# Patient Record
Sex: FEMALE | Race: ASIAN | NOT HISPANIC OR LATINO
[De-identification: names, ages, dates, MRNs, and addresses within clinical notes are randomized per-mention and may not be internally consistent; named-entity substitution may affect disease eponyms.]

---

## 2018-02-05 ENCOUNTER — ASOB RESULT (OUTPATIENT)
Age: 38
End: 2018-02-05

## 2018-02-05 ENCOUNTER — APPOINTMENT (OUTPATIENT)
Dept: OBGYN | Facility: CLINIC | Age: 38
End: 2018-02-05
Payer: COMMERCIAL

## 2018-02-05 VITALS
DIASTOLIC BLOOD PRESSURE: 78 MMHG | HEART RATE: 96 BPM | SYSTOLIC BLOOD PRESSURE: 120 MMHG | WEIGHT: 142 LBS | HEIGHT: 59 IN | BODY MASS INDEX: 28.63 KG/M2

## 2018-02-05 PROCEDURE — 76801 OB US < 14 WKS SINGLE FETUS: CPT

## 2018-02-05 PROCEDURE — 0501F PRENATAL FLOW SHEET: CPT

## 2018-02-05 RX ORDER — PNV NO.95/FERROUS FUM/FOLIC AC 28MG-0.8MG
TABLET ORAL
Refills: 0 | Status: ACTIVE | COMMUNITY

## 2018-02-11 LAB
ABO + RH PNL BLD: NORMAL
B19V IGG SER QL IA: 0.4 INDEX
B19V IGG+IGM SER-IMP: NEGATIVE
B19V IGG+IGM SER-IMP: NORMAL
B19V IGM FLD-ACNC: 0.3 INDEX
B19V IGM SER-ACNC: NEGATIVE
BACTERIA UR CULT: NORMAL
BASOPHILS # BLD AUTO: 0.01 K/UL
BASOPHILS NFR BLD AUTO: 0.1 %
BLD GP AB SCN SERPL QL: NORMAL
C TRACH RRNA SPEC QL NAA+PROBE: NOT DETECTED
CMV IGG SERPL QL: 6.7 U/ML
CMV IGG SERPL-IMP: POSITIVE
CMV IGM SERPL QL: <8 AU/ML
CMV IGM SERPL QL: NEGATIVE
CYTOLOGY CVX/VAG DOC THIN PREP: NORMAL
EOSINOPHIL # BLD AUTO: 0.08 K/UL
EOSINOPHIL NFR BLD AUTO: 0.8 %
FMR1 GENE MUT ANL BLD/T: NORMAL
HBV SURFACE AG SER QL: NONREACTIVE
HCT VFR BLD CALC: 36.9 %
HGB A MFR BLD: 96.9 %
HGB A2 MFR BLD: 3.1 %
HGB BLD-MCNC: 11.8 G/DL
HGB FRACT BLD-IMP: NORMAL
HIV1+2 AB SPEC QL IA.RAPID: NONREACTIVE
HPV HIGH+LOW RISK DNA PNL CVX: NOT DETECTED
IMM GRANULOCYTES NFR BLD AUTO: 0.3 %
LYMPHOCYTES # BLD AUTO: 1.7 K/UL
LYMPHOCYTES NFR BLD AUTO: 16 %
MAN DIFF?: NORMAL
MCHC RBC-ENTMCNC: 28.5 PG
MCHC RBC-ENTMCNC: 32 GM/DL
MCV RBC AUTO: 89.1 FL
MONOCYTES # BLD AUTO: 0.61 K/UL
MONOCYTES NFR BLD AUTO: 5.7 %
N GONORRHOEA RRNA SPEC QL NAA+PROBE: NOT DETECTED
NEUTROPHILS # BLD AUTO: 8.22 K/UL
NEUTROPHILS NFR BLD AUTO: 77.1 %
PLATELET # BLD AUTO: 209 K/UL
RBC # BLD: 4.14 M/UL
RBC # FLD: 13.4 %
RUBV IGG FLD-ACNC: 4.8 INDEX
RUBV IGG SER-IMP: POSITIVE
SOURCE AMPLIFICATION: NORMAL
T GONDII AB SER-IMP: NEGATIVE
T GONDII AB SER-IMP: NEGATIVE
T GONDII IGG SER QL: <3 IU/ML
T GONDII IGM SER QL: <3 AU/ML
T PALLIDUM AB SER QL IA: NEGATIVE
VZV AB TITR SER: POSITIVE
VZV IGG SER IF-ACNC: 601.5 INDEX
WBC # FLD AUTO: 10.65 K/UL

## 2018-02-16 LAB
AR GENE MUT ANL BLD/T: NORMAL
CFTR MUT TESTED BLD/T: NORMAL

## 2018-02-28 ENCOUNTER — APPOINTMENT (OUTPATIENT)
Dept: ANTEPARTUM | Facility: CLINIC | Age: 38
End: 2018-02-28
Payer: COMMERCIAL

## 2018-02-28 ENCOUNTER — ASOB RESULT (OUTPATIENT)
Age: 38
End: 2018-02-28

## 2018-02-28 ENCOUNTER — APPOINTMENT (OUTPATIENT)
Dept: ANTEPARTUM | Facility: CLINIC | Age: 38
End: 2018-02-28

## 2018-02-28 PROCEDURE — 76801 OB US < 14 WKS SINGLE FETUS: CPT

## 2018-02-28 PROCEDURE — 76813 OB US NUCHAL MEAS 1 GEST: CPT

## 2018-02-28 PROCEDURE — 36416 COLLJ CAPILLARY BLOOD SPEC: CPT

## 2018-03-06 ENCOUNTER — NON-APPOINTMENT (OUTPATIENT)
Age: 38
End: 2018-03-06

## 2018-03-06 ENCOUNTER — APPOINTMENT (OUTPATIENT)
Dept: OBGYN | Facility: CLINIC | Age: 38
End: 2018-03-06
Payer: COMMERCIAL

## 2018-03-06 VITALS
WEIGHT: 142 LBS | DIASTOLIC BLOOD PRESSURE: 74 MMHG | BODY MASS INDEX: 28.63 KG/M2 | HEIGHT: 59 IN | SYSTOLIC BLOOD PRESSURE: 112 MMHG

## 2018-03-06 DIAGNOSIS — Z87.59 PERSONAL HISTORY OF OTHER COMPLICATIONS OF PREGNANCY, CHILDBIRTH AND THE PUERPERIUM: ICD-10-CM

## 2018-03-06 PROCEDURE — 0502F SUBSEQUENT PRENATAL CARE: CPT

## 2018-03-31 ENCOUNTER — NON-APPOINTMENT (OUTPATIENT)
Age: 38
End: 2018-03-31

## 2018-04-12 ENCOUNTER — TRANSCRIPTION ENCOUNTER (OUTPATIENT)
Age: 38
End: 2018-04-12

## 2018-04-12 ENCOUNTER — NON-APPOINTMENT (OUTPATIENT)
Age: 38
End: 2018-04-12

## 2018-04-12 ENCOUNTER — LABORATORY RESULT (OUTPATIENT)
Age: 38
End: 2018-04-12

## 2018-04-12 ENCOUNTER — APPOINTMENT (OUTPATIENT)
Dept: OBGYN | Facility: CLINIC | Age: 38
End: 2018-04-12
Payer: COMMERCIAL

## 2018-04-12 VITALS
HEIGHT: 59 IN | BODY MASS INDEX: 29.64 KG/M2 | SYSTOLIC BLOOD PRESSURE: 106 MMHG | DIASTOLIC BLOOD PRESSURE: 71 MMHG | WEIGHT: 147 LBS

## 2018-04-12 PROCEDURE — 0502F SUBSEQUENT PRENATAL CARE: CPT

## 2018-04-12 RX ORDER — DOXYLAMINE SUCCINATE AND PYRIDOXINE HYDROCHLORIDE 10; 10 MG/1; MG/1
10-10 TABLET, DELAYED RELEASE ORAL
Qty: 30 | Refills: 2 | Status: DISCONTINUED | COMMUNITY
Start: 2018-03-06 | End: 2018-04-12

## 2018-04-13 ENCOUNTER — NON-APPOINTMENT (OUTPATIENT)
Age: 38
End: 2018-04-13

## 2018-04-16 ENCOUNTER — OTHER (OUTPATIENT)
Age: 38
End: 2018-04-16

## 2018-04-16 LAB
2ND TRIMESTER DATA: NORMAL
AFP PNL SERPL: NORMAL
AFP SERPL-ACNC: NORMAL
CLINICAL BIOCHEMIST REVIEW: NORMAL
NOTES NTD: NORMAL

## 2018-04-30 ENCOUNTER — APPOINTMENT (OUTPATIENT)
Dept: OBGYN | Facility: CLINIC | Age: 38
End: 2018-04-30
Payer: COMMERCIAL

## 2018-04-30 VITALS
HEIGHT: 59 IN | SYSTOLIC BLOOD PRESSURE: 100 MMHG | WEIGHT: 152.13 LBS | DIASTOLIC BLOOD PRESSURE: 69 MMHG | BODY MASS INDEX: 30.67 KG/M2

## 2018-04-30 PROCEDURE — 0502F SUBSEQUENT PRENATAL CARE: CPT

## 2018-05-01 ENCOUNTER — APPOINTMENT (OUTPATIENT)
Dept: ANTEPARTUM | Facility: CLINIC | Age: 38
End: 2018-05-01
Payer: COMMERCIAL

## 2018-05-01 ENCOUNTER — ASOB RESULT (OUTPATIENT)
Age: 38
End: 2018-05-01

## 2018-05-01 PROCEDURE — 99201 OFFICE OUTPATIENT NEW 10 MINUTES: CPT | Mod: 25

## 2018-05-01 PROCEDURE — 76811 OB US DETAILED SNGL FETUS: CPT

## 2018-05-31 ENCOUNTER — NON-APPOINTMENT (OUTPATIENT)
Age: 38
End: 2018-05-31

## 2018-05-31 ENCOUNTER — APPOINTMENT (OUTPATIENT)
Dept: OBGYN | Facility: CLINIC | Age: 38
End: 2018-05-31
Payer: COMMERCIAL

## 2018-05-31 VITALS
DIASTOLIC BLOOD PRESSURE: 72 MMHG | HEIGHT: 59 IN | SYSTOLIC BLOOD PRESSURE: 113 MMHG | BODY MASS INDEX: 31.45 KG/M2 | WEIGHT: 156 LBS

## 2018-05-31 PROCEDURE — 0502F SUBSEQUENT PRENATAL CARE: CPT

## 2018-06-12 ENCOUNTER — ASOB RESULT (OUTPATIENT)
Age: 38
End: 2018-06-12

## 2018-06-12 ENCOUNTER — APPOINTMENT (OUTPATIENT)
Dept: ANTEPARTUM | Facility: CLINIC | Age: 38
End: 2018-06-12
Payer: COMMERCIAL

## 2018-06-12 PROCEDURE — 76816 OB US FOLLOW-UP PER FETUS: CPT

## 2018-06-14 ENCOUNTER — NON-APPOINTMENT (OUTPATIENT)
Age: 38
End: 2018-06-14

## 2018-06-14 ENCOUNTER — APPOINTMENT (OUTPATIENT)
Dept: OBGYN | Facility: CLINIC | Age: 38
End: 2018-06-14
Payer: COMMERCIAL

## 2018-06-14 VITALS
SYSTOLIC BLOOD PRESSURE: 115 MMHG | BODY MASS INDEX: 31.85 KG/M2 | HEIGHT: 59 IN | DIASTOLIC BLOOD PRESSURE: 71 MMHG | WEIGHT: 158 LBS

## 2018-06-14 PROCEDURE — 90471 IMMUNIZATION ADMIN: CPT

## 2018-06-14 PROCEDURE — 90715 TDAP VACCINE 7 YRS/> IM: CPT

## 2018-06-14 PROCEDURE — 0502F SUBSEQUENT PRENATAL CARE: CPT

## 2018-06-15 ENCOUNTER — OTHER (OUTPATIENT)
Age: 38
End: 2018-06-15

## 2018-06-15 LAB — GLUCOSE 1H P 50 G GLC PO SERPL-MCNC: 172 MG/DL

## 2018-06-18 ENCOUNTER — OTHER (OUTPATIENT)
Age: 38
End: 2018-06-18

## 2018-06-19 ENCOUNTER — OTHER (OUTPATIENT)
Age: 38
End: 2018-06-19

## 2018-06-19 LAB
BASOPHILS # BLD AUTO: 0.01 K/UL
BASOPHILS NFR BLD AUTO: 0.1 %
EOSINOPHIL # BLD AUTO: 0.1 K/UL
EOSINOPHIL NFR BLD AUTO: 1.2 %
HCT VFR BLD CALC: 31 %
HGB BLD-MCNC: 9.7 G/DL
IMM GRANULOCYTES NFR BLD AUTO: 0.6 %
LYMPHOCYTES # BLD AUTO: 1.58 K/UL
LYMPHOCYTES NFR BLD AUTO: 19.4 %
MAN DIFF?: NORMAL
MCHC RBC-ENTMCNC: 28.4 PG
MCHC RBC-ENTMCNC: 31.3 GM/DL
MCV RBC AUTO: 90.6 FL
MONOCYTES # BLD AUTO: 0.37 K/UL
MONOCYTES NFR BLD AUTO: 4.5 %
NEUTROPHILS # BLD AUTO: 6.05 K/UL
NEUTROPHILS NFR BLD AUTO: 74.2 %
PLATELET # BLD AUTO: 146 K/UL
RBC # BLD: 3.42 M/UL
RBC # FLD: 13.5 %
WBC # FLD AUTO: 8.16 K/UL

## 2018-06-27 ENCOUNTER — APPOINTMENT (OUTPATIENT)
Dept: OBGYN | Facility: CLINIC | Age: 38
End: 2018-06-27
Payer: COMMERCIAL

## 2018-06-27 ENCOUNTER — NON-APPOINTMENT (OUTPATIENT)
Age: 38
End: 2018-06-27

## 2018-06-27 VITALS
WEIGHT: 157 LBS | HEIGHT: 59 IN | DIASTOLIC BLOOD PRESSURE: 73 MMHG | SYSTOLIC BLOOD PRESSURE: 111 MMHG | BODY MASS INDEX: 31.65 KG/M2

## 2018-06-27 PROCEDURE — 0502F SUBSEQUENT PRENATAL CARE: CPT

## 2018-07-10 ENCOUNTER — ASOB RESULT (OUTPATIENT)
Age: 38
End: 2018-07-10

## 2018-07-10 ENCOUNTER — APPOINTMENT (OUTPATIENT)
Dept: ANTEPARTUM | Facility: CLINIC | Age: 38
End: 2018-07-10
Payer: COMMERCIAL

## 2018-07-10 PROCEDURE — 76819 FETAL BIOPHYS PROFIL W/O NST: CPT

## 2018-07-10 PROCEDURE — 76816 OB US FOLLOW-UP PER FETUS: CPT

## 2018-07-13 ENCOUNTER — APPOINTMENT (OUTPATIENT)
Dept: OBGYN | Facility: CLINIC | Age: 38
End: 2018-07-13
Payer: COMMERCIAL

## 2018-07-13 ENCOUNTER — NON-APPOINTMENT (OUTPATIENT)
Age: 38
End: 2018-07-13

## 2018-07-13 VITALS
DIASTOLIC BLOOD PRESSURE: 74 MMHG | WEIGHT: 159.19 LBS | BODY MASS INDEX: 32.09 KG/M2 | HEIGHT: 59 IN | SYSTOLIC BLOOD PRESSURE: 108 MMHG

## 2018-07-13 VITALS — HEIGHT: 59 IN

## 2018-07-13 PROCEDURE — 0502F SUBSEQUENT PRENATAL CARE: CPT

## 2018-07-27 ENCOUNTER — APPOINTMENT (OUTPATIENT)
Dept: OBGYN | Facility: CLINIC | Age: 38
End: 2018-07-27

## 2018-07-27 VITALS
WEIGHT: 159.9 LBS | HEIGHT: 59 IN | SYSTOLIC BLOOD PRESSURE: 117 MMHG | DIASTOLIC BLOOD PRESSURE: 74 MMHG | BODY MASS INDEX: 32.24 KG/M2

## 2018-08-07 ENCOUNTER — ASOB RESULT (OUTPATIENT)
Age: 38
End: 2018-08-07

## 2018-08-07 ENCOUNTER — APPOINTMENT (OUTPATIENT)
Dept: ANTEPARTUM | Facility: CLINIC | Age: 38
End: 2018-08-07
Payer: COMMERCIAL

## 2018-08-07 PROCEDURE — 76819 FETAL BIOPHYS PROFIL W/O NST: CPT

## 2018-08-07 PROCEDURE — 76816 OB US FOLLOW-UP PER FETUS: CPT

## 2018-08-10 ENCOUNTER — NON-APPOINTMENT (OUTPATIENT)
Age: 38
End: 2018-08-10

## 2018-08-10 ENCOUNTER — APPOINTMENT (OUTPATIENT)
Dept: OBGYN | Facility: CLINIC | Age: 38
End: 2018-08-10
Payer: COMMERCIAL

## 2018-08-10 VITALS
BODY MASS INDEX: 32.86 KG/M2 | DIASTOLIC BLOOD PRESSURE: 75 MMHG | WEIGHT: 163 LBS | HEIGHT: 59 IN | SYSTOLIC BLOOD PRESSURE: 116 MMHG

## 2018-08-10 PROCEDURE — 0502F SUBSEQUENT PRENATAL CARE: CPT

## 2018-08-15 ENCOUNTER — APPOINTMENT (OUTPATIENT)
Dept: OBGYN | Facility: CLINIC | Age: 38
End: 2018-08-15
Payer: COMMERCIAL

## 2018-08-15 ENCOUNTER — NON-APPOINTMENT (OUTPATIENT)
Age: 38
End: 2018-08-15

## 2018-08-15 VITALS
BODY MASS INDEX: 33.06 KG/M2 | WEIGHT: 164 LBS | DIASTOLIC BLOOD PRESSURE: 72 MMHG | HEIGHT: 59 IN | SYSTOLIC BLOOD PRESSURE: 108 MMHG

## 2018-08-15 PROCEDURE — 0502F SUBSEQUENT PRENATAL CARE: CPT

## 2018-08-17 ENCOUNTER — APPOINTMENT (OUTPATIENT)
Dept: OBGYN | Facility: CLINIC | Age: 38
End: 2018-08-17

## 2018-08-22 ENCOUNTER — OUTPATIENT (OUTPATIENT)
Dept: OUTPATIENT SERVICES | Facility: HOSPITAL | Age: 38
LOS: 1 days | End: 2018-08-22

## 2018-08-22 DIAGNOSIS — Z98.890 OTHER SPECIFIED POSTPROCEDURAL STATES: ICD-10-CM

## 2018-08-22 LAB
APPEARANCE UR: CLEAR — SIGNIFICANT CHANGE UP
BACTERIA # UR AUTO: SIGNIFICANT CHANGE UP
BILIRUB UR-MCNC: NEGATIVE — SIGNIFICANT CHANGE UP
BLD GP AB SCN SERPL QL: NEGATIVE — SIGNIFICANT CHANGE UP
BLOOD UR QL VISUAL: NEGATIVE — SIGNIFICANT CHANGE UP
COLOR SPEC: SIGNIFICANT CHANGE UP
GLUCOSE UR-MCNC: NEGATIVE — SIGNIFICANT CHANGE UP
HCT VFR BLD CALC: 30.9 % — LOW (ref 34.5–45)
HGB BLD-MCNC: 9.7 G/DL — LOW (ref 11.5–15.5)
KETONES UR-MCNC: NEGATIVE — SIGNIFICANT CHANGE UP
LEUKOCYTE ESTERASE UR-ACNC: SIGNIFICANT CHANGE UP
MCHC RBC-ENTMCNC: 27.6 PG — SIGNIFICANT CHANGE UP (ref 27–34)
MCHC RBC-ENTMCNC: 31.4 % — LOW (ref 32–36)
MCV RBC AUTO: 87.8 FL — SIGNIFICANT CHANGE UP (ref 80–100)
NITRITE UR-MCNC: NEGATIVE — SIGNIFICANT CHANGE UP
NRBC # FLD: 0 — SIGNIFICANT CHANGE UP
PH UR: 6.5 — SIGNIFICANT CHANGE UP (ref 5–8)
PLATELET # BLD AUTO: 118 K/UL — LOW (ref 150–400)
PMV BLD: 12.8 FL — SIGNIFICANT CHANGE UP (ref 7–13)
PROT UR-MCNC: SIGNIFICANT CHANGE UP
RBC # BLD: 3.52 M/UL — LOW (ref 3.8–5.2)
RBC # FLD: 14 % — SIGNIFICANT CHANGE UP (ref 10.3–14.5)
RBC CASTS # UR COMP ASSIST: SIGNIFICANT CHANGE UP (ref 0–?)
RH IG SCN BLD-IMP: POSITIVE — SIGNIFICANT CHANGE UP
SP GR SPEC: 1.02 — SIGNIFICANT CHANGE UP (ref 1–1.04)
SQUAMOUS # UR AUTO: SIGNIFICANT CHANGE UP
UROBILINOGEN FLD QL: NORMAL — SIGNIFICANT CHANGE UP
WBC # BLD: 5.71 K/UL — SIGNIFICANT CHANGE UP (ref 3.8–10.5)
WBC # FLD AUTO: 5.71 K/UL — SIGNIFICANT CHANGE UP (ref 3.8–10.5)
WBC UR QL: HIGH (ref 0–?)

## 2018-08-23 LAB — T PALLIDUM AB TITR SER: NEGATIVE — SIGNIFICANT CHANGE UP

## 2018-08-24 ENCOUNTER — APPOINTMENT (OUTPATIENT)
Dept: OBGYN | Facility: CLINIC | Age: 38
End: 2018-08-24
Payer: COMMERCIAL

## 2018-08-24 VITALS
BODY MASS INDEX: 32.86 KG/M2 | DIASTOLIC BLOOD PRESSURE: 76 MMHG | SYSTOLIC BLOOD PRESSURE: 113 MMHG | HEIGHT: 59 IN | WEIGHT: 163 LBS

## 2018-08-24 PROCEDURE — 0502F SUBSEQUENT PRENATAL CARE: CPT

## 2018-08-28 ENCOUNTER — APPOINTMENT (OUTPATIENT)
Dept: ANTEPARTUM | Facility: CLINIC | Age: 38
End: 2018-08-28
Payer: COMMERCIAL

## 2018-08-28 ENCOUNTER — NON-APPOINTMENT (OUTPATIENT)
Age: 38
End: 2018-08-28

## 2018-08-28 ENCOUNTER — APPOINTMENT (OUTPATIENT)
Dept: OBGYN | Facility: CLINIC | Age: 38
End: 2018-08-28
Payer: COMMERCIAL

## 2018-08-28 ENCOUNTER — ASOB RESULT (OUTPATIENT)
Age: 38
End: 2018-08-28

## 2018-08-28 VITALS
HEIGHT: 59 IN | DIASTOLIC BLOOD PRESSURE: 79 MMHG | BODY MASS INDEX: 33.26 KG/M2 | WEIGHT: 165 LBS | SYSTOLIC BLOOD PRESSURE: 118 MMHG

## 2018-08-28 LAB
GLUCOSE 1H P 100 G GLC PO SERPL-MCNC: 147 MG/DL
GLUCOSE 2H P 100 G GLC PO SERPL-MCNC: 136 MG/DL
GLUCOSE 3H P CHAL SERPL-MCNC: 116 MG/DL
GLUCOSE BS SERPL-MCNC: 81 MG/DL
GP B STREP DNA SPEC QL NAA+PROBE: DETECTED
GP B STREP DNA SPEC QL NAA+PROBE: NORMAL
SOURCE GBS: NORMAL

## 2018-08-28 PROCEDURE — 76816 OB US FOLLOW-UP PER FETUS: CPT

## 2018-08-28 PROCEDURE — 0502F SUBSEQUENT PRENATAL CARE: CPT

## 2018-08-28 PROCEDURE — 76819 FETAL BIOPHYS PROFIL W/O NST: CPT

## 2018-09-05 ENCOUNTER — NON-APPOINTMENT (OUTPATIENT)
Age: 38
End: 2018-09-05

## 2018-09-05 ENCOUNTER — TRANSCRIPTION ENCOUNTER (OUTPATIENT)
Age: 38
End: 2018-09-05

## 2018-09-05 ENCOUNTER — APPOINTMENT (OUTPATIENT)
Dept: OBGYN | Facility: CLINIC | Age: 38
End: 2018-09-05
Payer: COMMERCIAL

## 2018-09-05 VITALS
BODY MASS INDEX: 33.35 KG/M2 | DIASTOLIC BLOOD PRESSURE: 70 MMHG | HEIGHT: 59 IN | WEIGHT: 165.44 LBS | SYSTOLIC BLOOD PRESSURE: 104 MMHG

## 2018-09-05 PROCEDURE — 0502F SUBSEQUENT PRENATAL CARE: CPT

## 2018-09-06 ENCOUNTER — APPOINTMENT (OUTPATIENT)
Dept: OBGYN | Facility: HOSPITAL | Age: 38
End: 2018-09-06

## 2018-09-06 ENCOUNTER — INPATIENT (INPATIENT)
Facility: HOSPITAL | Age: 38
LOS: 2 days | Discharge: HOME | End: 2018-09-09
Attending: OBSTETRICS & GYNECOLOGY | Admitting: OBSTETRICS & GYNECOLOGY
Payer: COMMERCIAL

## 2018-09-06 ENCOUNTER — TRANSCRIPTION ENCOUNTER (OUTPATIENT)
Age: 38
End: 2018-09-06

## 2018-09-06 VITALS — HEIGHT: 59 IN | WEIGHT: 163.14 LBS

## 2018-09-06 DIAGNOSIS — Z98.890 OTHER SPECIFIED POSTPROCEDURAL STATES: ICD-10-CM

## 2018-09-06 LAB
ALBUMIN SERPL ELPH-MCNC: 2.8 G/DL — LOW (ref 3.3–5)
ALBUMIN SERPL ELPH-MCNC: 2.8 G/DL — LOW (ref 3.3–5)
ALP SERPL-CCNC: 172 U/L — HIGH (ref 40–120)
ALP SERPL-CCNC: 172 U/L — HIGH (ref 40–120)
ALT FLD-CCNC: 4 U/L — SIGNIFICANT CHANGE UP (ref 4–33)
ALT FLD-CCNC: 4 U/L — SIGNIFICANT CHANGE UP (ref 4–33)
APTT BLD: 31.4 SEC — SIGNIFICANT CHANGE UP (ref 27.5–37.4)
AST SERPL-CCNC: 35 U/L — HIGH (ref 4–32)
AST SERPL-CCNC: 35 U/L — HIGH (ref 4–32)
BASOPHILS # BLD AUTO: 0.02 K/UL — SIGNIFICANT CHANGE UP (ref 0–0.2)
BASOPHILS NFR BLD AUTO: 0.4 % — SIGNIFICANT CHANGE UP (ref 0–2)
BILIRUB DIRECT SERPL-MCNC: 0.1 MG/DL — SIGNIFICANT CHANGE UP (ref 0.1–0.2)
BILIRUB SERPL-MCNC: < 0.2 MG/DL — LOW (ref 0.2–1.2)
BILIRUB SERPL-MCNC: < 0.2 MG/DL — LOW (ref 0.2–1.2)
BLD GP AB SCN SERPL QL: NEGATIVE — SIGNIFICANT CHANGE UP
BUN SERPL-MCNC: 8 MG/DL — SIGNIFICANT CHANGE UP (ref 7–23)
CALCIUM SERPL-MCNC: 8.6 MG/DL — SIGNIFICANT CHANGE UP (ref 8.4–10.5)
CHLORIDE SERPL-SCNC: 106 MMOL/L — SIGNIFICANT CHANGE UP (ref 98–107)
CO2 SERPL-SCNC: 20 MMOL/L — LOW (ref 22–31)
CREAT SERPL-MCNC: 0.6 MG/DL — SIGNIFICANT CHANGE UP (ref 0.5–1.3)
EOSINOPHIL # BLD AUTO: 0.07 K/UL — SIGNIFICANT CHANGE UP (ref 0–0.5)
EOSINOPHIL NFR BLD AUTO: 1.2 % — SIGNIFICANT CHANGE UP (ref 0–6)
FIBRINOGEN PPP-MCNC: 506.3 MG/DL — SIGNIFICANT CHANGE UP (ref 310–510)
GLUCOSE SERPL-MCNC: 84 MG/DL — SIGNIFICANT CHANGE UP (ref 70–99)
HCT VFR BLD CALC: 30.2 % — LOW (ref 34.5–45)
HCT VFR BLD CALC: 31 % — LOW (ref 34.5–45)
HGB BLD-MCNC: 10.1 G/DL — LOW (ref 11.5–15.5)
HGB BLD-MCNC: 9.7 G/DL — LOW (ref 11.5–15.5)
IMM GRANULOCYTES # BLD AUTO: 0.05 # — SIGNIFICANT CHANGE UP
IMM GRANULOCYTES NFR BLD AUTO: 0.9 % — SIGNIFICANT CHANGE UP (ref 0–1.5)
INR BLD: 0.91 — SIGNIFICANT CHANGE UP (ref 0.88–1.17)
LYMPHOCYTES # BLD AUTO: 1.35 K/UL — SIGNIFICANT CHANGE UP (ref 1–3.3)
LYMPHOCYTES # BLD AUTO: 23.6 % — SIGNIFICANT CHANGE UP (ref 13–44)
MCHC RBC-ENTMCNC: 28.2 PG — SIGNIFICANT CHANGE UP (ref 27–34)
MCHC RBC-ENTMCNC: 28.4 PG — SIGNIFICANT CHANGE UP (ref 27–34)
MCHC RBC-ENTMCNC: 32.1 % — SIGNIFICANT CHANGE UP (ref 32–36)
MCHC RBC-ENTMCNC: 32.6 % — SIGNIFICANT CHANGE UP (ref 32–36)
MCV RBC AUTO: 86.6 FL — SIGNIFICANT CHANGE UP (ref 80–100)
MCV RBC AUTO: 88.6 FL — SIGNIFICANT CHANGE UP (ref 80–100)
MONOCYTES # BLD AUTO: 0.34 K/UL — SIGNIFICANT CHANGE UP (ref 0–0.9)
MONOCYTES NFR BLD AUTO: 6 % — SIGNIFICANT CHANGE UP (ref 2–14)
NEUTROPHILS # BLD AUTO: 3.88 K/UL — SIGNIFICANT CHANGE UP (ref 1.8–7.4)
NEUTROPHILS NFR BLD AUTO: 67.9 % — SIGNIFICANT CHANGE UP (ref 43–77)
NRBC # FLD: 0 — SIGNIFICANT CHANGE UP
NRBC # FLD: 0 — SIGNIFICANT CHANGE UP
PLATELET # BLD AUTO: 111 K/UL — LOW (ref 150–400)
PLATELET # BLD AUTO: 114 K/UL — LOW (ref 150–400)
PMV BLD: 12.5 FL — SIGNIFICANT CHANGE UP (ref 7–13)
PMV BLD: 12.8 FL — SIGNIFICANT CHANGE UP (ref 7–13)
POTASSIUM SERPL-MCNC: 3.9 MMOL/L — SIGNIFICANT CHANGE UP (ref 3.5–5.3)
POTASSIUM SERPL-SCNC: 3.9 MMOL/L — SIGNIFICANT CHANGE UP (ref 3.5–5.3)
PROT SERPL-MCNC: 5.7 G/DL — LOW (ref 6–8.3)
PROT SERPL-MCNC: 5.7 G/DL — LOW (ref 6–8.3)
PROTHROM AB SERPL-ACNC: 10.4 SEC — SIGNIFICANT CHANGE UP (ref 9.8–13.1)
RBC # BLD: 3.41 M/UL — LOW (ref 3.8–5.2)
RBC # BLD: 3.58 M/UL — LOW (ref 3.8–5.2)
RBC # FLD: 14.6 % — HIGH (ref 10.3–14.5)
RBC # FLD: 14.8 % — HIGH (ref 10.3–14.5)
RH IG SCN BLD-IMP: POSITIVE — SIGNIFICANT CHANGE UP
SODIUM SERPL-SCNC: 139 MMOL/L — SIGNIFICANT CHANGE UP (ref 135–145)
T PALLIDUM AB TITR SER: NEGATIVE — SIGNIFICANT CHANGE UP
URATE SERPL-MCNC: 4.9 MG/DL — SIGNIFICANT CHANGE UP (ref 2.5–7)
WBC # BLD: 5.71 K/UL — SIGNIFICANT CHANGE UP (ref 3.8–10.5)
WBC # BLD: 5.9 K/UL — SIGNIFICANT CHANGE UP (ref 3.8–10.5)
WBC # FLD AUTO: 5.71 K/UL — SIGNIFICANT CHANGE UP (ref 3.8–10.5)
WBC # FLD AUTO: 5.9 K/UL — SIGNIFICANT CHANGE UP (ref 3.8–10.5)

## 2018-09-06 PROCEDURE — 59514 CESAREAN DELIVERY ONLY: CPT | Mod: AS,U9

## 2018-09-06 PROCEDURE — 59510 CESAREAN DELIVERY: CPT | Mod: U9,UB,GC

## 2018-09-06 RX ORDER — KETOROLAC TROMETHAMINE 30 MG/ML
30 SYRINGE (ML) INJECTION EVERY 6 HOURS
Qty: 0 | Refills: 0 | Status: DISCONTINUED | OUTPATIENT
Start: 2018-09-06 | End: 2018-09-07

## 2018-09-06 RX ORDER — SODIUM CHLORIDE 9 MG/ML
1000 INJECTION, SOLUTION INTRAVENOUS
Qty: 0 | Refills: 0 | Status: DISCONTINUED | OUTPATIENT
Start: 2018-09-06 | End: 2018-09-06

## 2018-09-06 RX ORDER — OXYTOCIN 10 UNIT/ML
41.67 VIAL (ML) INJECTION
Qty: 20 | Refills: 0 | Status: DISCONTINUED | OUTPATIENT
Start: 2018-09-06 | End: 2018-09-06

## 2018-09-06 RX ORDER — OXYCODONE HYDROCHLORIDE 5 MG/1
5 TABLET ORAL
Qty: 0 | Refills: 0 | Status: DISCONTINUED | OUTPATIENT
Start: 2018-09-06 | End: 2018-09-07

## 2018-09-06 RX ORDER — TETANUS TOXOID, REDUCED DIPHTHERIA TOXOID AND ACELLULAR PERTUSSIS VACCINE, ADSORBED 5; 2.5; 8; 8; 2.5 [IU]/.5ML; [IU]/.5ML; UG/.5ML; UG/.5ML; UG/.5ML
0.5 SUSPENSION INTRAMUSCULAR ONCE
Qty: 0 | Refills: 0 | Status: DISCONTINUED | OUTPATIENT
Start: 2018-09-06 | End: 2018-09-09

## 2018-09-06 RX ORDER — FERROUS SULFATE 325(65) MG
325 TABLET ORAL DAILY
Qty: 0 | Refills: 0 | Status: DISCONTINUED | OUTPATIENT
Start: 2018-09-06 | End: 2018-09-07

## 2018-09-06 RX ORDER — SODIUM CHLORIDE 9 MG/ML
1000 INJECTION, SOLUTION INTRAVENOUS
Qty: 0 | Refills: 0 | Status: DISCONTINUED | OUTPATIENT
Start: 2018-09-06 | End: 2018-09-07

## 2018-09-06 RX ORDER — IBUPROFEN 200 MG
600 TABLET ORAL EVERY 6 HOURS
Qty: 0 | Refills: 0 | Status: COMPLETED | OUTPATIENT
Start: 2018-09-06 | End: 2019-08-05

## 2018-09-06 RX ORDER — FAMOTIDINE 10 MG/ML
20 INJECTION INTRAVENOUS ONCE
Qty: 0 | Refills: 0 | Status: COMPLETED | OUTPATIENT
Start: 2018-09-06 | End: 2018-09-06

## 2018-09-06 RX ORDER — DOCUSATE SODIUM 100 MG
100 CAPSULE ORAL
Qty: 0 | Refills: 0 | Status: DISCONTINUED | OUTPATIENT
Start: 2018-09-06 | End: 2018-09-09

## 2018-09-06 RX ORDER — HYDROMORPHONE HYDROCHLORIDE 2 MG/ML
1 INJECTION INTRAMUSCULAR; INTRAVENOUS; SUBCUTANEOUS
Qty: 0 | Refills: 0 | Status: DISCONTINUED | OUTPATIENT
Start: 2018-09-06 | End: 2018-09-07

## 2018-09-06 RX ORDER — SODIUM CHLORIDE 9 MG/ML
1000 INJECTION, SOLUTION INTRAVENOUS ONCE
Qty: 0 | Refills: 0 | Status: COMPLETED | OUTPATIENT
Start: 2018-09-06 | End: 2018-09-06

## 2018-09-06 RX ORDER — OXYCODONE HYDROCHLORIDE 5 MG/1
5 TABLET ORAL
Qty: 0 | Refills: 0 | Status: COMPLETED | OUTPATIENT
Start: 2018-09-06 | End: 2018-09-13

## 2018-09-06 RX ORDER — IBUPROFEN 200 MG
600 TABLET ORAL EVERY 6 HOURS
Qty: 0 | Refills: 0 | Status: DISCONTINUED | OUTPATIENT
Start: 2018-09-06 | End: 2018-09-06

## 2018-09-06 RX ORDER — METOCLOPRAMIDE HCL 10 MG
10 TABLET ORAL ONCE
Qty: 0 | Refills: 0 | Status: COMPLETED | OUTPATIENT
Start: 2018-09-06 | End: 2018-09-06

## 2018-09-06 RX ORDER — ONDANSETRON 8 MG/1
4 TABLET, FILM COATED ORAL EVERY 6 HOURS
Qty: 0 | Refills: 0 | Status: DISCONTINUED | OUTPATIENT
Start: 2018-09-06 | End: 2018-09-09

## 2018-09-06 RX ORDER — ACETAMINOPHEN 500 MG
975 TABLET ORAL EVERY 6 HOURS
Qty: 0 | Refills: 0 | Status: DISCONTINUED | OUTPATIENT
Start: 2018-09-06 | End: 2018-09-06

## 2018-09-06 RX ORDER — CITRIC ACID/SODIUM CITRATE 300-500 MG
30 SOLUTION, ORAL ORAL ONCE
Qty: 0 | Refills: 0 | Status: COMPLETED | OUTPATIENT
Start: 2018-09-06 | End: 2018-09-06

## 2018-09-06 RX ORDER — IBUPROFEN 200 MG
1 TABLET ORAL
Qty: 0 | Refills: 0 | COMMUNITY

## 2018-09-06 RX ORDER — DIPHENHYDRAMINE HCL 50 MG
25 CAPSULE ORAL EVERY 6 HOURS
Qty: 0 | Refills: 0 | Status: DISCONTINUED | OUTPATIENT
Start: 2018-09-06 | End: 2018-09-09

## 2018-09-06 RX ORDER — LANOLIN
1 OINTMENT (GRAM) TOPICAL
Qty: 0 | Refills: 0 | Status: DISCONTINUED | OUTPATIENT
Start: 2018-09-06 | End: 2018-09-09

## 2018-09-06 RX ORDER — ACETAMINOPHEN 500 MG
975 TABLET ORAL EVERY 6 HOURS
Qty: 0 | Refills: 0 | Status: DISCONTINUED | OUTPATIENT
Start: 2018-09-06 | End: 2018-09-09

## 2018-09-06 RX ORDER — HYDROMORPHONE HYDROCHLORIDE 2 MG/ML
0.5 INJECTION INTRAMUSCULAR; INTRAVENOUS; SUBCUTANEOUS
Qty: 0 | Refills: 0 | Status: DISCONTINUED | OUTPATIENT
Start: 2018-09-06 | End: 2018-09-07

## 2018-09-06 RX ORDER — OXYCODONE HYDROCHLORIDE 5 MG/1
5 TABLET ORAL EVERY 4 HOURS
Qty: 0 | Refills: 0 | Status: DISCONTINUED | OUTPATIENT
Start: 2018-09-06 | End: 2018-09-09

## 2018-09-06 RX ORDER — GLYCERIN ADULT
1 SUPPOSITORY, RECTAL RECTAL AT BEDTIME
Qty: 0 | Refills: 0 | Status: DISCONTINUED | OUTPATIENT
Start: 2018-09-06 | End: 2018-09-09

## 2018-09-06 RX ORDER — OXYCODONE HYDROCHLORIDE 5 MG/1
10 TABLET ORAL
Qty: 0 | Refills: 0 | Status: DISCONTINUED | OUTPATIENT
Start: 2018-09-06 | End: 2018-09-07

## 2018-09-06 RX ORDER — OXYTOCIN 10 UNIT/ML
333.33 VIAL (ML) INJECTION
Qty: 20 | Refills: 0 | Status: DISCONTINUED | OUTPATIENT
Start: 2018-09-06 | End: 2018-09-06

## 2018-09-06 RX ORDER — KETOROLAC TROMETHAMINE 30 MG/ML
30 SYRINGE (ML) INJECTION EVERY 6 HOURS
Qty: 0 | Refills: 0 | Status: DISCONTINUED | OUTPATIENT
Start: 2018-09-06 | End: 2018-09-06

## 2018-09-06 RX ORDER — SIMETHICONE 80 MG/1
80 TABLET, CHEWABLE ORAL EVERY 4 HOURS
Qty: 0 | Refills: 0 | Status: DISCONTINUED | OUTPATIENT
Start: 2018-09-06 | End: 2018-09-09

## 2018-09-06 RX ORDER — NALOXONE HYDROCHLORIDE 4 MG/.1ML
0.1 SPRAY NASAL
Qty: 0 | Refills: 0 | Status: DISCONTINUED | OUTPATIENT
Start: 2018-09-06 | End: 2018-09-07

## 2018-09-06 RX ADMIN — Medication 30 MILLIGRAM(S): at 21:25

## 2018-09-06 RX ADMIN — Medication 10 MILLIGRAM(S): at 11:11

## 2018-09-06 RX ADMIN — Medication 30 MILLIGRAM(S): at 20:45

## 2018-09-06 RX ADMIN — Medication 30 MILLILITER(S): at 11:11

## 2018-09-06 RX ADMIN — ONDANSETRON 4 MILLIGRAM(S): 8 TABLET, FILM COATED ORAL at 20:45

## 2018-09-06 RX ADMIN — SODIUM CHLORIDE 200 MILLILITER(S): 9 INJECTION, SOLUTION INTRAVENOUS at 11:12

## 2018-09-06 RX ADMIN — SODIUM CHLORIDE 2000 MILLILITER(S): 9 INJECTION, SOLUTION INTRAVENOUS at 11:11

## 2018-09-06 RX ADMIN — Medication 125 MILLIUNIT(S)/MIN: at 17:34

## 2018-09-06 RX ADMIN — FAMOTIDINE 20 MILLIGRAM(S): 10 INJECTION INTRAVENOUS at 11:11

## 2018-09-06 NOTE — DISCHARGE NOTE OB - CARE PLAN
Principal Discharge DX:	 delivery delivered  Goal:	complete recovery  Assessment and plan of treatment:	stable, routine post operative care

## 2018-09-06 NOTE — DISCHARGE NOTE OB - MEDICATION SUMMARY - MEDICATIONS TO TAKE
I will START or STAY ON the medications listed below when I get home from the hospital:    Motrin 600 mg oral tablet  -- 1 tab(s) by mouth every 6 hours, As Needed  -- Indication: For  delivery    Prenate Mini with DHA oral capsule  -- 1 cap(s) by mouth once a day  -- Indication: For  delivery

## 2018-09-06 NOTE — DISCHARGE NOTE OB - CARE PROVIDER_API CALL
Trudi Schroeedr (MD), Obstetrics and Gynecology  [facility] [facility]  [address] [place] [address]  Phone: [phone]  Fax: [phone]

## 2018-09-06 NOTE — DISCHARGE NOTE OB - PATIENT PORTAL LINK FT
You can access the [facility] Patient Portal, offered by [facility], by registering with the following website: [email or web address]

## 2018-09-07 LAB
HCT VFR BLD CALC: 26 % — LOW (ref 34.5–45)
HGB BLD-MCNC: 8.4 G/DL — LOW (ref 11.5–15.5)
MCHC RBC-ENTMCNC: 27.5 PG — SIGNIFICANT CHANGE UP (ref 27–34)
MCHC RBC-ENTMCNC: 32.3 % — SIGNIFICANT CHANGE UP (ref 32–36)
MCV RBC AUTO: 85 FL — SIGNIFICANT CHANGE UP (ref 80–100)
NRBC # FLD: 0 — SIGNIFICANT CHANGE UP
PLATELET # BLD AUTO: 103 K/UL — LOW (ref 150–400)
PMV BLD: 12.4 FL — SIGNIFICANT CHANGE UP (ref 7–13)
RBC # BLD: 3.06 M/UL — LOW (ref 3.8–5.2)
RBC # FLD: 14.7 % — HIGH (ref 10.3–14.5)
WBC # BLD: 8.79 K/UL — SIGNIFICANT CHANGE UP (ref 3.8–10.5)
WBC # FLD AUTO: 8.79 K/UL — SIGNIFICANT CHANGE UP (ref 3.8–10.5)

## 2018-09-07 RX ORDER — FERROUS SULFATE 325(65) MG
325 TABLET ORAL THREE TIMES A DAY
Qty: 0 | Refills: 0 | Status: DISCONTINUED | OUTPATIENT
Start: 2018-09-07 | End: 2018-09-09

## 2018-09-07 RX ORDER — ASCORBIC ACID 60 MG
500 TABLET,CHEWABLE ORAL DAILY
Qty: 0 | Refills: 0 | Status: DISCONTINUED | OUTPATIENT
Start: 2018-09-07 | End: 2018-09-09

## 2018-09-07 RX ORDER — IBUPROFEN 200 MG
600 TABLET ORAL EVERY 6 HOURS
Qty: 0 | Refills: 0 | Status: DISCONTINUED | OUTPATIENT
Start: 2018-09-07 | End: 2018-09-09

## 2018-09-07 RX ORDER — OXYCODONE HYDROCHLORIDE 5 MG/1
5 TABLET ORAL
Qty: 0 | Refills: 0 | Status: DISCONTINUED | OUTPATIENT
Start: 2018-09-07 | End: 2018-09-09

## 2018-09-07 RX ADMIN — Medication 975 MILLIGRAM(S): at 06:05

## 2018-09-07 RX ADMIN — Medication 600 MILLIGRAM(S): at 22:00

## 2018-09-07 RX ADMIN — Medication 975 MILLIGRAM(S): at 12:31

## 2018-09-07 RX ADMIN — SIMETHICONE 80 MILLIGRAM(S): 80 TABLET, CHEWABLE ORAL at 00:31

## 2018-09-07 RX ADMIN — Medication 600 MILLIGRAM(S): at 21:30

## 2018-09-07 RX ADMIN — SIMETHICONE 80 MILLIGRAM(S): 80 TABLET, CHEWABLE ORAL at 06:20

## 2018-09-07 RX ADMIN — Medication 975 MILLIGRAM(S): at 06:35

## 2018-09-07 RX ADMIN — Medication 325 MILLIGRAM(S): at 12:01

## 2018-09-07 RX ADMIN — Medication 975 MILLIGRAM(S): at 17:47

## 2018-09-07 RX ADMIN — Medication 1 TABLET(S): at 12:01

## 2018-09-07 RX ADMIN — OXYCODONE HYDROCHLORIDE 5 MILLIGRAM(S): 5 TABLET ORAL at 21:30

## 2018-09-07 RX ADMIN — Medication 975 MILLIGRAM(S): at 18:18

## 2018-09-07 RX ADMIN — OXYCODONE HYDROCHLORIDE 5 MILLIGRAM(S): 5 TABLET ORAL at 22:00

## 2018-09-07 RX ADMIN — Medication 975 MILLIGRAM(S): at 12:01

## 2018-09-07 RX ADMIN — Medication 1 APPLICATION(S): at 00:31

## 2018-09-07 RX ADMIN — SIMETHICONE 80 MILLIGRAM(S): 80 TABLET, CHEWABLE ORAL at 12:01

## 2018-09-07 NOTE — PROGRESS NOTE ADULT - ASSESSMENT
38y POD1 s/p  section meeting all appropriate post-op milestones. Platelets low at 103, no bleeding 38y POD1 s/p  section meeting all appropriate post-op milestones. Platelets low at 103 likely gestational thrombocytopenia no bleeding

## 2018-09-07 NOTE — PROVIDER CONTACT NOTE (OTHER) - ASSESSMENT
vital signs stable. Fundus firm. light to moderate bleeding noted. Incision is clean, dry, and intact. Patient denies any dizziness, lightheadedness or palpitations. Rodriguez draining adequately at this time. Patient denies any calf pain, leg cramping, or shortness of breath. Called Resident for clarification if patient needs heparin at this time.

## 2018-09-07 NOTE — PROVIDER CONTACT NOTE (OTHER) - ACTION/TREATMENT ORDERED:
Dr. Vazquez verbalized that because platelet count is 103, to hold heparin at this time. If further action is needed or if heparin is to be given later in the day, she will contact RN. All needs met.

## 2018-09-08 LAB
HCT VFR BLD CALC: 24.3 % — LOW (ref 34.5–45)
HGB BLD-MCNC: 7.9 G/DL — LOW (ref 11.5–15.5)
MCHC RBC-ENTMCNC: 28.6 PG — SIGNIFICANT CHANGE UP (ref 27–34)
MCHC RBC-ENTMCNC: 32.5 % — SIGNIFICANT CHANGE UP (ref 32–36)
MCV RBC AUTO: 88 FL — SIGNIFICANT CHANGE UP (ref 80–100)
NRBC # FLD: 0 — SIGNIFICANT CHANGE UP
PLATELET # BLD AUTO: 129 K/UL — LOW (ref 150–400)
PMV BLD: 11.8 FL — SIGNIFICANT CHANGE UP (ref 7–13)
RBC # BLD: 2.76 M/UL — LOW (ref 3.8–5.2)
RBC # FLD: 15.2 % — HIGH (ref 10.3–14.5)
WBC # BLD: 9.19 K/UL — SIGNIFICANT CHANGE UP (ref 3.8–10.5)
WBC # FLD AUTO: 9.19 K/UL — SIGNIFICANT CHANGE UP (ref 3.8–10.5)

## 2018-09-08 RX ORDER — MAGNESIUM HYDROXIDE 400 MG/1
30 TABLET, CHEWABLE ORAL DAILY
Qty: 0 | Refills: 0 | Status: DISCONTINUED | OUTPATIENT
Start: 2018-09-08 | End: 2018-09-09

## 2018-09-08 RX ADMIN — Medication 1 APPLICATION(S): at 12:35

## 2018-09-08 RX ADMIN — Medication 325 MILLIGRAM(S): at 14:35

## 2018-09-08 RX ADMIN — Medication 1 SUPPOSITORY(S): at 09:03

## 2018-09-08 RX ADMIN — Medication 975 MILLIGRAM(S): at 15:05

## 2018-09-08 RX ADMIN — Medication 600 MILLIGRAM(S): at 03:30

## 2018-09-08 RX ADMIN — Medication 975 MILLIGRAM(S): at 14:35

## 2018-09-08 RX ADMIN — Medication 975 MILLIGRAM(S): at 03:30

## 2018-09-08 RX ADMIN — Medication 975 MILLIGRAM(S): at 20:45

## 2018-09-08 RX ADMIN — Medication 975 MILLIGRAM(S): at 09:33

## 2018-09-08 RX ADMIN — Medication 975 MILLIGRAM(S): at 09:03

## 2018-09-08 RX ADMIN — Medication 1 TABLET(S): at 12:12

## 2018-09-08 RX ADMIN — Medication 600 MILLIGRAM(S): at 02:49

## 2018-09-08 RX ADMIN — Medication 500 MILLIGRAM(S): at 12:12

## 2018-09-08 RX ADMIN — Medication 600 MILLIGRAM(S): at 20:45

## 2018-09-08 RX ADMIN — Medication 600 MILLIGRAM(S): at 21:30

## 2018-09-08 RX ADMIN — Medication 975 MILLIGRAM(S): at 02:49

## 2018-09-08 RX ADMIN — Medication 325 MILLIGRAM(S): at 03:05

## 2018-09-08 RX ADMIN — Medication 600 MILLIGRAM(S): at 14:35

## 2018-09-08 RX ADMIN — Medication 600 MILLIGRAM(S): at 15:05

## 2018-09-08 RX ADMIN — Medication 975 MILLIGRAM(S): at 21:30

## 2018-09-08 RX ADMIN — Medication 600 MILLIGRAM(S): at 09:33

## 2018-09-08 RX ADMIN — Medication 600 MILLIGRAM(S): at 09:03

## 2018-09-08 NOTE — PROGRESS NOTE ADULT - ATTENDING COMMENTS
Agree with assessment and plan. Discharge planning
patient seen and examined at bedside. agree with above assessment and plan

## 2018-09-09 VITALS
DIASTOLIC BLOOD PRESSURE: 72 MMHG | HEART RATE: 80 BPM | RESPIRATION RATE: 17 BRPM | SYSTOLIC BLOOD PRESSURE: 124 MMHG | OXYGEN SATURATION: 97 % | TEMPERATURE: 98 F

## 2018-09-09 RX ADMIN — Medication 600 MILLIGRAM(S): at 03:45

## 2018-09-09 RX ADMIN — Medication 600 MILLIGRAM(S): at 15:12

## 2018-09-09 RX ADMIN — Medication 975 MILLIGRAM(S): at 04:30

## 2018-09-09 RX ADMIN — Medication 975 MILLIGRAM(S): at 09:40

## 2018-09-09 RX ADMIN — Medication 975 MILLIGRAM(S): at 15:13

## 2018-09-09 RX ADMIN — Medication 600 MILLIGRAM(S): at 04:30

## 2018-09-09 RX ADMIN — Medication 600 MILLIGRAM(S): at 09:40

## 2018-09-09 RX ADMIN — Medication 975 MILLIGRAM(S): at 03:45

## 2018-09-09 NOTE — PROGRESS NOTE ADULT - SUBJECTIVE AND OBJECTIVE BOX
Day ___1 of Anesthesia Pain Management Service    SUBJECTIVE:  Pain Scale Score	At rest: __none_ 	With Activity: __mild_ 	[s ] Refer to charted pain scores    THERAPY:    s/p _____0.2___ mg PF morphine    OBJECTIVE:    Sedation Score:	[ x] Alert	[ ] Drowsy	[ ] Arousable	[ ] Asleep	[ ] Unresponsive    Side Effects:	[x ] None	[ ] Nausea	[ ] Vomiting	[ ] Pruritus  		  [ ] Weakness		[ ] Numbness	[ ] Other:    Vital Signs Last 24 Hrs  T(C): 36.9 (07 Sep 2018 14:37), Max: 36.9 (07 Sep 2018 02:00)  T(F): 98.4 (07 Sep 2018 14:37), Max: 98.4 (07 Sep 2018 02:00)  HR: 88 (07 Sep 2018 14:37) (54 - 88)  BP: 123/80 (07 Sep 2018 14:37) (114/67 - 155/79)  BP(mean): 88 (06 Sep 2018 20:00) (79 - 108)  RR: 18 (07 Sep 2018 14:37) (8 - 21)  SpO2: 97% (07 Sep 2018 14:37) (97% - 100%)    ASSESSMENT/ PLAN  [x ] Patient transitioned to prn analgesics  [ x] Pain management per primary service, pain service to sign off   [ x]Documentation and Verification of current medications     Comments: No anesthetic complications.
Postop Day  __1_ s/p   C- Section    THERAPY:  [ x ] Spinal morphine   [  ] Epidural morphine   [  ] IV PCA Hydromorphone 1 mg/ml      Sedation Score:	  [ x ] Alert	    [  ] Drowsy        [  ] Arousable	[  ] Asleep	[  ] Unresponsive    Side Effects:	  [ x ] None	     [  ] Nausea        [  ] Pruritus        [  ] Weakness   [  ] Numbness        ASSESSMENT/ PLAN   [   ] Discontinue         [  ] Continue  [ x ]Documentation and Verification of current medications     Comments: Transitioned to prn oral analgesics by primary team. No anesthetic complication.
Postpartum Note,  Section     38y year old woman post-operative day 1 from uncomplicated LTCS.  No acute events overnight.  Patient has no complaints and pain is well-controlled.  Patient is tolerating regular diet, passing flatus, ambulating, has a richards in place.  Postpartum bleeding is well controlled.    Physical exam:    Vital Signs Last 24 Hrs  T(C): 36.9 (07 Sep 2018 05:39), Max: 36.9 (07 Sep 2018 02:00)  T(F): 98.4 (07 Sep 2018 05:39), Max: 98.4 (07 Sep 2018 02:00)  HR: 86 (07 Sep 2018 05:39) (54 - 86)  BP: 126/75 (07 Sep 2018 05:39) (114/67 - 155/79)  BP(mean): 88 (06 Sep 2018 20:00) (79 - 108)  RR: 16 (07 Sep 2018 05:39) (8 - 21)  SpO2: 100% (07 Sep 2018 05:39) (99% - 100%)     @ 07:01  -   @ 07:00  --------------------------------------------------------  IN: 2375 mL / OUT: 1875 mL / NET: 500 mL        Gen: NAD  Abdomen: Soft, nontender, no distension, firm uterine fundus at umbilicus.  Incision: Clean, dry, and intact  Pelvic: Normal lochia noted  Ext: No calf tenderness    LABS:                        8.4    8.79  )-----------( 103      ( 07 Sep 2018 05:30 )             26.0                         9.7    5.90  )-----------( 111      ( 06 Sep 2018 16:30 )             30.2                         10.1   5.71  )-----------( 114      ( 06 Sep 2018 09:15 )             31.0       18 @ 16:30      139  |  106  |  8   ----------------------------<  84  3.9   |  20<L>  |  0.60        Ca    8.6      06 Sep 2018 16:30    TPro  5.7<L>  /  Alb  2.8<L>  /  TBili  < 0.2<L>  /  DBili  0.1  /  AST  35<H>  /  ALT  4   /  AlkPhos  172<H>  18 @ 16:30
SUBJECTIVE:    Pain: Controlled    Complaints: None    MILESTONES:    Alert and Oriented x 3  [ x ]  Out of bed/ ambulating. [ x ]  Flatus:   Positive [   ]  Negative [ X ]  Bowel movement  [  ] Positive [  ] Negative   Voiding [x  ] Due to void [  ]   Rodriguez/Indwelling catheter in place [  ]  Diet: Regular [ x ]  Clears [  ]  NPO [  ]    Infant feeding:  Breast [  ]   Bottle [  ]  Both [ X ]  Feeding related issues and/or concerns:      OBJECTIVE:  T(C): 36.5 (18 @ 05:32), Max: 37 (18 @ 22:30)  HR: 74 (18 @ 05:32) (74 - 88)  BP: 131/82 (18 @ 05:32) (121/77 - 131/82)  RR: 16 (18 @ 05:32) (16 - 18)  SpO2: 97% (18 @ 05:32) (97% - 100%)  Wt(kg): --                        8.4    8.79  )-----------( 103      ( 07 Sep 2018 05:30 )             26.0           Blood Type: A Positive    RPR: Negative          MEDICATIONS  (STANDING):  acetaminophen   Tablet .. 975 milliGRAM(s) Oral every 6 hours  ascorbic acid 500 milliGRAM(s) Oral daily  diphtheria/tetanus/pertussis (acellular) Vaccine (ADAcel) 0.5 milliLiter(s) IntraMuscular once  ferrous    sulfate 325 milliGRAM(s) Oral three times a day  ibuprofen  Tablet. 600 milliGRAM(s) Oral every 6 hours  oxyCODONE    IR 5 milliGRAM(s) Oral every 3 hours  prenatal multivitamin 1 Tablet(s) Oral daily    MEDICATIONS  (PRN):  diphenhydrAMINE   Capsule 25 milliGRAM(s) Oral every 6 hours PRN Itching  docusate sodium 100 milliGRAM(s) Oral two times a day PRN Stool Softening  glycerin Suppository - Adult 1 Suppository(s) Rectal at bedtime PRN Constipation  lanolin Ointment 1 Application(s) Topical every 3 hours PRN Sore Nipples  magnesium hydroxide Suspension 30 milliLiter(s) Oral daily PRN Constipation  ondansetron Injectable 4 milliGRAM(s) IV Push every 6 hours PRN Nausea  oxyCODONE    IR 5 milliGRAM(s) Oral every 4 hours PRN Severe Pain (7 - 10)  simethicone 80 milliGRAM(s) Chew every 4 hours PRN Gas        ASSESSMENT:    38y     G   3   P         PO Day#  2        Delivery: Primary [  ]    Repeat [X  ]       EBL - 800                                  Indication of procedure: Scheduled    Condition: Stable    Past Medical History significant for: HPI: Hx. Vulvar Cancer in  - s/p Vulvectomy      Current Issues:    Breasts:  Soft [x  ]   Engorged [  ]  Nipples:  Abdomen: Soft [ x ]   Distended [  ] Nontender [  ]     Bowel sounds :  Present [  ]  Absent [  ]   Fundus:  Firm [x  ]  Boggy [  ]    Abdominal incision: Clean, Dry and Intact [x  ]  Staples [  ] Steri Strips [ X ] Dermabond [  ] Sutures [  ]    Patient wearing abdominal binder for support.    Vaginal: Lochia:  Heavy [  ]  Moderate [ x ]   Scant [  ]    Extremities: Edema [X  ] Negative Taylor's Sign [ X ] Nontender Pacheco  [ x ] Positive pedal pulses [  ]    Other relevant physical exam findings:      PLAN:    Plan: Increase ambulation, analgesia PRN and pain medication protocol standing oxycodone, ibuprofen and acetaminophen.    Diet: Regular diet    Continue routine post-operative and postpartum care.  On Iron for Anemia.  No Flatus yet. Increase ambulation, MOM as needed.    Discharge Planning [ x ]    For discharge Today  [    ]    Consults:  Social Work [  ]  Lactation [ x ]  Other [         ]
OB Progress Note: LTCS, POD#3    S: 39yo POD#3 s/p LTCS. Pain is well controlled. She is tolerating a regular diet and passing flatus. She is voiding spontaneously, and ambulating without difficulty. Denies CP/SOB. Denies lightheadedness/dizziness. Denies N/V.    O:  Vitals:  Vital Signs Last 24 Hrs  T(C): 36.5 (09 Sep 2018 05:41), Max: 36.6 (08 Sep 2018 13:54)  T(F): 97.7 (09 Sep 2018 05:41), Max: 97.9 (08 Sep 2018 13:54)  HR: 80 (09 Sep 2018 05:41) (80 - 80)  BP: 124/72 (09 Sep 2018 05:41) (124/72 - 139/80)  BP(mean): --  RR: 17 (09 Sep 2018 05:41) (17 - 18)  SpO2: 97% (09 Sep 2018 05:41) (97% - 99%)    MEDICATIONS  (STANDING):  acetaminophen   Tablet .. 975 milliGRAM(s) Oral every 6 hours  ascorbic acid 500 milliGRAM(s) Oral daily  diphtheria/tetanus/pertussis (acellular) Vaccine (ADAcel) 0.5 milliLiter(s) IntraMuscular once  ferrous    sulfate 325 milliGRAM(s) Oral three times a day  ibuprofen  Tablet. 600 milliGRAM(s) Oral every 6 hours  oxyCODONE    IR 5 milliGRAM(s) Oral every 3 hours  prenatal multivitamin 1 Tablet(s) Oral daily      MEDICATIONS  (PRN):  diphenhydrAMINE   Capsule 25 milliGRAM(s) Oral every 6 hours PRN Itching  docusate sodium 100 milliGRAM(s) Oral two times a day PRN Stool Softening  glycerin Suppository - Adult 1 Suppository(s) Rectal at bedtime PRN Constipation  lanolin Ointment 1 Application(s) Topical every 3 hours PRN Sore Nipples  magnesium hydroxide Suspension 30 milliLiter(s) Oral daily PRN Constipation  oxyCODONE    IR 5 milliGRAM(s) Oral every 4 hours PRN Severe Pain (7 - 10)  simethicone 80 milliGRAM(s) Chew every 4 hours PRN Gas      Labs:  Blood type: A Positive  Rubella IgG: RPR: Negative                          7.9<L>   9.19 >-----------< 129<L>    ( 09-08 @ 22:20 )             24.3<L>                        8.4<L>   8.79 >-----------< 103<L>    ( 09-07 @ 05:30 )             26.0<L>                        9.7<L>   5.90 >-----------< 111<L>    ( 09-06 @ 16:30 )             30.2<L>    09-06-18 @ 16:30      139  |  106  |  8   ----------------------------<  84  3.9   |  20<L>  |  0.60        Ca    8.6      06 Sep 2018 16:30    TPro  5.7<L>  /  Alb  2.8<L>  /  TBili  < 0.2<L>  /  DBili  0.1  /  AST  35<H>  /  ALT  4   /  AlkPhos  172<H>  09-06-18 @ 16:30    PE:  General: NAD  Abdomen: Soft, appropriately tender, incision c/d/i.  Extremities: No erythema, no pitting edema

## 2018-09-09 NOTE — PROGRESS NOTE ADULT - PROBLEM SELECTOR PLAN 1
-Encourage Ambulation  -Continue with regular diet  -Heparin, SCDs, and ambulation for DVT ppx  -Check CBC  -Analgesia as needed    Barbra Valentine MD PGY1  Pager #[phone]
-Encourage Ambulation  -Continue with regular diet  -Heparin, SCDs, and ambulation for DVT ppx  -Check CBC  -Analgesia as needed    Della Carrera PGY-1  Pager #: [phone]

## 2018-10-16 ENCOUNTER — APPOINTMENT (OUTPATIENT)
Dept: OBGYN | Facility: CLINIC | Age: 38
End: 2018-10-16
Payer: COMMERCIAL

## 2018-10-16 VITALS
WEIGHT: 138.44 LBS | SYSTOLIC BLOOD PRESSURE: 132 MMHG | HEART RATE: 74 BPM | DIASTOLIC BLOOD PRESSURE: 89 MMHG | HEIGHT: 59 IN | BODY MASS INDEX: 27.91 KG/M2

## 2018-10-16 DIAGNOSIS — N89.8 OTHER SPECIFIED NONINFLAMMATORY DISORDERS OF VAGINA: ICD-10-CM

## 2018-10-16 PROCEDURE — 0502F SUBSEQUENT PRENATAL CARE: CPT

## 2018-10-17 ENCOUNTER — OTHER (OUTPATIENT)
Age: 38
End: 2018-10-17

## 2018-10-20 LAB
CANDIDA VAG CYTO: NOT DETECTED
G VAGINALIS+PREV SP MTYP VAG QL MICRO: NOT DETECTED
T VAGINALIS VAG QL WET PREP: NOT DETECTED

## 2018-10-23 ENCOUNTER — APPOINTMENT (OUTPATIENT)
Dept: OBGYN | Facility: CLINIC | Age: 38
End: 2018-10-23
Payer: SELF-PAY

## 2018-10-23 VITALS
SYSTOLIC BLOOD PRESSURE: 130 MMHG | DIASTOLIC BLOOD PRESSURE: 83 MMHG | BODY MASS INDEX: 27.01 KG/M2 | HEIGHT: 59 IN | WEIGHT: 134 LBS | HEART RATE: 88 BPM

## 2018-10-23 DIAGNOSIS — Z30.9 ENCOUNTER FOR CONTRACEPTIVE MANAGEMENT, UNSPECIFIED: ICD-10-CM

## 2018-10-23 DIAGNOSIS — R73.09 OTHER ABNORMAL GLUCOSE: ICD-10-CM

## 2018-10-23 DIAGNOSIS — Z34.90 ENCOUNTER FOR SUPERVISION OF NORMAL PREGNANCY, UNSPECIFIED, UNSPECIFIED TRIMESTER: ICD-10-CM

## 2018-10-23 LAB
HCG UR QL: NEGATIVE
QUALITY CONTROL: YES

## 2018-10-23 PROCEDURE — 99214 OFFICE O/P EST MOD 30 MIN: CPT

## 2018-10-23 RX ORDER — NORETHINDRONE 0.35 MG/1
0.35 TABLET ORAL DAILY
Qty: 3 | Refills: 3 | Status: ACTIVE | COMMUNITY
Start: 2018-10-23 | End: 1900-01-01

## 2019-02-25 ENCOUNTER — APPOINTMENT (OUTPATIENT)
Dept: OBGYN | Facility: CLINIC | Age: 39
End: 2019-02-25
Payer: COMMERCIAL

## 2019-02-25 VITALS
BODY MASS INDEX: 27.82 KG/M2 | WEIGHT: 138 LBS | DIASTOLIC BLOOD PRESSURE: 81 MMHG | HEART RATE: 89 BPM | HEIGHT: 59 IN | SYSTOLIC BLOOD PRESSURE: 122 MMHG

## 2019-02-25 DIAGNOSIS — L65.9 NONSCARRING HAIR LOSS, UNSPECIFIED: ICD-10-CM

## 2019-02-25 DIAGNOSIS — Z01.419 ENCOUNTER FOR GYNECOLOGICAL EXAMINATION (GENERAL) (ROUTINE) W/OUT ABNORMAL FINDINGS: ICD-10-CM

## 2019-02-25 PROCEDURE — 99395 PREV VISIT EST AGE 18-39: CPT

## 2019-03-01 LAB
C TRACH RRNA SPEC QL NAA+PROBE: NOT DETECTED
CYTOLOGY CVX/VAG DOC THIN PREP: NORMAL
HPV HIGH+LOW RISK DNA PNL CVX: NOT DETECTED
N GONORRHOEA RRNA SPEC QL NAA+PROBE: NOT DETECTED
SOURCE AMPLIFICATION: NORMAL
T4 FREE SERPL-MCNC: 1.2 NG/DL
TSH SERPL-ACNC: 1.17 UIU/ML

## 2019-11-12 ENCOUNTER — TRANSCRIPTION ENCOUNTER (OUTPATIENT)
Age: 39
End: 2019-11-12

## 2019-12-01 ENCOUNTER — TRANSCRIPTION ENCOUNTER (OUTPATIENT)
Age: 39
End: 2019-12-01

## 2020-02-16 ENCOUNTER — TRANSCRIPTION ENCOUNTER (OUTPATIENT)
Age: 40
End: 2020-02-16

## 2020-02-19 ENCOUNTER — TRANSCRIPTION ENCOUNTER (OUTPATIENT)
Age: 40
End: 2020-02-19

## 2020-03-05 ENCOUNTER — TRANSCRIPTION ENCOUNTER (OUTPATIENT)
Age: 40
End: 2020-03-05

## 2020-04-25 ENCOUNTER — MESSAGE (OUTPATIENT)
Age: 40
End: 2020-04-25

## 2020-05-07 ENCOUNTER — APPOINTMENT (OUTPATIENT)
Dept: DISASTER EMERGENCY | Facility: HOSPITAL | Age: 40
End: 2020-05-07

## 2020-05-07 LAB
SARS-COV-2 IGG SERPL IA-ACNC: <0.1 INDEX
SARS-COV-2 IGG SERPL QL IA: NEGATIVE

## 2020-12-21 PROBLEM — Z01.419 ENCOUNTER FOR ANNUAL ROUTINE GYNECOLOGICAL EXAMINATION: Status: RESOLVED | Noted: 2019-02-25 | Resolved: 2020-12-21

## 2021-11-09 ENCOUNTER — TRANSCRIPTION ENCOUNTER (OUTPATIENT)
Age: 41
End: 2021-11-09

## 2021-11-15 ENCOUNTER — TRANSCRIPTION ENCOUNTER (OUTPATIENT)
Age: 41
End: 2021-11-15

## 2023-01-14 ENCOUNTER — NON-APPOINTMENT (OUTPATIENT)
Age: 43
End: 2023-01-14

## 2023-05-16 ENCOUNTER — APPOINTMENT (OUTPATIENT)
Dept: OTOLARYNGOLOGY | Facility: CLINIC | Age: 43
End: 2023-05-16
Payer: COMMERCIAL

## 2023-05-16 ENCOUNTER — NON-APPOINTMENT (OUTPATIENT)
Age: 43
End: 2023-05-16

## 2023-05-16 VITALS
DIASTOLIC BLOOD PRESSURE: 82 MMHG | SYSTOLIC BLOOD PRESSURE: 124 MMHG | HEART RATE: 80 BPM | WEIGHT: 123 LBS | BODY MASS INDEX: 24.8 KG/M2 | HEIGHT: 59 IN

## 2023-05-16 DIAGNOSIS — H93.19 TINNITUS, UNSPECIFIED EAR: ICD-10-CM

## 2023-05-16 PROCEDURE — 99204 OFFICE O/P NEW MOD 45 MIN: CPT

## 2023-05-16 PROCEDURE — 92567 TYMPANOMETRY: CPT

## 2023-05-16 PROCEDURE — 92557 COMPREHENSIVE HEARING TEST: CPT

## 2023-05-16 NOTE — PHYSICAL EXAM
[Normal] : mucosa is normal [Midline] : trachea located in midline position [de-identified] : NO BRUIT HEARD IN NECK AUSCULTATION

## 2023-05-16 NOTE — ASSESSMENT
[FreeTextEntry1] :  WNL\par CAROTID DOPPLER/ CARDIOLOGY/ PMD EVALUATION\par ANEMIA DISCUSSED/ POSSIBLE AGGRAVATING FACTOR\par F/U AFTER ABOVE

## 2023-05-16 NOTE — REVIEW OF SYSTEMS
[Seasonal Allergies] : seasonal allergies [Dizziness] : dizziness [Vertigo] : vertigo [Lightheadedness] : lightheadedness [Negative] : Heme/Lymph [Patient Intake Form Reviewed] : Patient intake form was reviewed

## 2023-09-18 NOTE — DISCHARGE NOTE OB - DRINK 8 TO 10 GLASSES OF FLUID EACH DAY
Caller: Sivakumar Gray    Relationship: Self    Best call back number: [phone]     Requested Prescriptions:   Requested Prescriptions     Pending Prescriptions Disp Refills    [place] Thyroid 60 MG tablet [Pharmacy Med Name: THYROID (ARMOUR) 1GR  (60MG)  TABS] 90 tablet 1     Sig: TAKE 1 TABLET BY MOUTH DAILY        Pharmacy where request should be sent: [id number] - [place], [place] - [address] AT [address] /[facility] [phone] [facility] [phone] FX     Last office visit with prescribing clinician: 7/14/2023   Last telemedicine visit with prescribing clinician: Visit date not found   Next office visit with prescribing clinician: Visit date not found     Additional details provided by patient: OUT OF MEDICATION     Does the patient have less than a 3 day supply:  [x] Yes  [] No    Would you like a call back once the refill request has been completed: [] Yes [] No    If the office needs to give you a call back, can they leave a voicemail: [] Yes [] No    Sudhakar Velez Rep   09/18/23 11:34 EDT       
Statement Selected

## 2023-12-29 ENCOUNTER — NON-APPOINTMENT (OUTPATIENT)
Age: 43
End: 2023-12-29